# Patient Record
Sex: FEMALE | Race: WHITE | NOT HISPANIC OR LATINO | URBAN - METROPOLITAN AREA
[De-identification: names, ages, dates, MRNs, and addresses within clinical notes are randomized per-mention and may not be internally consistent; named-entity substitution may affect disease eponyms.]

---

## 2019-04-03 ENCOUNTER — HOSPITAL ENCOUNTER (EMERGENCY)
Facility: HOSPITAL | Age: 49
Discharge: HOME | End: 2019-04-04
Attending: EMERGENCY MEDICINE | Admitting: EMERGENCY MEDICINE

## 2019-04-03 DIAGNOSIS — R10.13 EPIGASTRIC PAIN: ICD-10-CM

## 2019-04-03 DIAGNOSIS — M25.50 POLYARTHRALGIA: ICD-10-CM

## 2019-04-03 DIAGNOSIS — M35.3 POLYMYALGIA (HCC): ICD-10-CM

## 2019-04-03 DIAGNOSIS — H81.10 BENIGN PAROXYSMAL POSITIONAL VERTIGO, UNSPECIFIED LATERALITY: Primary | ICD-10-CM

## 2019-04-03 DIAGNOSIS — E86.0 DEHYDRATION: ICD-10-CM

## 2019-04-03 PROCEDURE — 96374 THER/PROPH/DIAG INJ IV PUSH: CPT

## 2019-04-03 PROCEDURE — 99284 EMERGENCY DEPT VISIT MOD MDM: CPT

## 2019-04-03 PROCEDURE — 96375 TX/PRO/DX INJ NEW DRUG ADDON: CPT

## 2019-04-03 PROCEDURE — 96376 TX/PRO/DX INJ SAME DRUG ADON: CPT

## 2019-04-04 ENCOUNTER — APPOINTMENT (OUTPATIENT)
Dept: CT IMAGING | Facility: HOSPITAL | Age: 49
End: 2019-04-04

## 2019-04-04 ENCOUNTER — APPOINTMENT (OUTPATIENT)
Dept: GENERAL RADIOLOGY | Facility: HOSPITAL | Age: 49
End: 2019-04-04

## 2019-04-04 VITALS
SYSTOLIC BLOOD PRESSURE: 137 MMHG | DIASTOLIC BLOOD PRESSURE: 88 MMHG | WEIGHT: 126 LBS | HEART RATE: 111 BPM | RESPIRATION RATE: 18 BRPM | HEIGHT: 62 IN | TEMPERATURE: 98 F | OXYGEN SATURATION: 95 % | BODY MASS INDEX: 23.19 KG/M2

## 2019-04-04 LAB
ALBUMIN SERPL-MCNC: 4.4 G/DL (ref 3.5–5.2)
ALBUMIN/GLOB SERPL: 1.6 G/DL
ALP SERPL-CCNC: 130 U/L (ref 39–117)
ALT SERPL W P-5'-P-CCNC: 59 U/L (ref 1–33)
AMPHET+METHAMPHET UR QL: NEGATIVE
ANION GAP SERPL CALCULATED.3IONS-SCNC: 17.5 MMOL/L
AST SERPL-CCNC: 32 U/L (ref 1–32)
BARBITURATES UR QL SCN: NEGATIVE
BASOPHILS # BLD AUTO: 0.05 10*3/MM3 (ref 0–0.2)
BASOPHILS NFR BLD AUTO: 0.3 % (ref 0–1.5)
BENZODIAZ UR QL SCN: NEGATIVE
BILIRUB SERPL-MCNC: 0.2 MG/DL (ref 0.2–1.2)
BUN BLD-MCNC: 8 MG/DL (ref 6–20)
BUN/CREAT SERPL: 10.3 (ref 7–25)
CALCIUM SPEC-SCNC: 9.1 MG/DL (ref 8.6–10.5)
CANNABINOIDS SERPL QL: NEGATIVE
CHLORIDE SERPL-SCNC: 100 MMOL/L (ref 98–107)
CK SERPL-CCNC: 101 U/L (ref 20–180)
CO2 SERPL-SCNC: 20.5 MMOL/L (ref 22–29)
COCAINE UR QL: NEGATIVE
CREAT BLD-MCNC: 0.78 MG/DL (ref 0.57–1)
DEPRECATED RDW RBC AUTO: 39.8 FL (ref 37–54)
EOSINOPHIL # BLD AUTO: 0.03 10*3/MM3 (ref 0–0.4)
EOSINOPHIL NFR BLD AUTO: 0.2 % (ref 0.3–6.2)
ERYTHROCYTE [DISTWIDTH] IN BLOOD BY AUTOMATED COUNT: 11.9 % (ref 12.3–15.4)
ETHANOL BLD-MCNC: 66 MG/DL (ref 0–10)
ETHANOL UR QL: 0.07 %
GFR SERPL CREATININE-BSD FRML MDRD: 78 ML/MIN/1.73
GFR SERPL CREATININE-BSD FRML MDRD: 95 ML/MIN/1.73
GLOBULIN UR ELPH-MCNC: 2.8 GM/DL
GLUCOSE BLD-MCNC: 103 MG/DL (ref 65–99)
HCT VFR BLD AUTO: 45.6 % (ref 34–46.6)
HGB BLD-MCNC: 14.7 G/DL (ref 12–15.9)
IMM GRANULOCYTES # BLD AUTO: 0.12 10*3/MM3 (ref 0–0.05)
IMM GRANULOCYTES NFR BLD AUTO: 0.8 % (ref 0–0.5)
LIPASE SERPL-CCNC: 18 U/L (ref 13–60)
LYMPHOCYTES # BLD AUTO: 3.17 10*3/MM3 (ref 0.7–3.1)
LYMPHOCYTES NFR BLD AUTO: 21.8 % (ref 19.6–45.3)
MAGNESIUM SERPL-MCNC: 1.8 MG/DL (ref 1.6–2.6)
MCH RBC QN AUTO: 29.6 PG (ref 26.6–33)
MCHC RBC AUTO-ENTMCNC: 32.2 G/DL (ref 31.5–35.7)
MCV RBC AUTO: 91.8 FL (ref 79–97)
METHADONE UR QL SCN: NEGATIVE
MONOCYTES # BLD AUTO: 0.75 10*3/MM3 (ref 0.1–0.9)
MONOCYTES NFR BLD AUTO: 5.2 % (ref 5–12)
NEUTROPHILS # BLD AUTO: 10.39 10*3/MM3 (ref 1.4–7)
NEUTROPHILS NFR BLD AUTO: 71.7 % (ref 42.7–76)
NRBC BLD AUTO-RTO: 0 /100 WBC (ref 0–0)
OPIATES UR QL: NEGATIVE
OXYCODONE UR QL SCN: NEGATIVE
PLATELET # BLD AUTO: 238 10*3/MM3 (ref 140–450)
PMV BLD AUTO: 12.1 FL (ref 6–12)
POTASSIUM BLD-SCNC: 3.7 MMOL/L (ref 3.5–5.2)
PROT SERPL-MCNC: 7.2 G/DL (ref 6–8.5)
RBC # BLD AUTO: 4.97 10*6/MM3 (ref 3.77–5.28)
SODIUM BLD-SCNC: 138 MMOL/L (ref 136–145)
TROPONIN T SERPL-MCNC: <0.01 NG/ML (ref 0–0.03)
WBC NRBC COR # BLD: 14.51 10*3/MM3 (ref 3.4–10.8)

## 2019-04-04 PROCEDURE — 80307 DRUG TEST PRSMV CHEM ANLYZR: CPT | Performed by: PHYSICIAN ASSISTANT

## 2019-04-04 PROCEDURE — 96374 THER/PROPH/DIAG INJ IV PUSH: CPT

## 2019-04-04 PROCEDURE — 25010000002 ONDANSETRON PER 1 MG: Performed by: PHYSICIAN ASSISTANT

## 2019-04-04 PROCEDURE — 96375 TX/PRO/DX INJ NEW DRUG ADDON: CPT

## 2019-04-04 PROCEDURE — 82550 ASSAY OF CK (CPK): CPT | Performed by: PHYSICIAN ASSISTANT

## 2019-04-04 PROCEDURE — 70450 CT HEAD/BRAIN W/O DYE: CPT

## 2019-04-04 PROCEDURE — 25010000002 LORAZEPAM PER 2 MG: Performed by: EMERGENCY MEDICINE

## 2019-04-04 PROCEDURE — 93005 ELECTROCARDIOGRAM TRACING: CPT | Performed by: PHYSICIAN ASSISTANT

## 2019-04-04 PROCEDURE — 93010 ELECTROCARDIOGRAM REPORT: CPT | Performed by: INTERNAL MEDICINE

## 2019-04-04 PROCEDURE — 83690 ASSAY OF LIPASE: CPT | Performed by: PHYSICIAN ASSISTANT

## 2019-04-04 PROCEDURE — 96376 TX/PRO/DX INJ SAME DRUG ADON: CPT

## 2019-04-04 PROCEDURE — 71046 X-RAY EXAM CHEST 2 VIEWS: CPT

## 2019-04-04 PROCEDURE — 84484 ASSAY OF TROPONIN QUANT: CPT | Performed by: PHYSICIAN ASSISTANT

## 2019-04-04 PROCEDURE — 80053 COMPREHEN METABOLIC PANEL: CPT | Performed by: PHYSICIAN ASSISTANT

## 2019-04-04 PROCEDURE — 83735 ASSAY OF MAGNESIUM: CPT | Performed by: PHYSICIAN ASSISTANT

## 2019-04-04 PROCEDURE — 85025 COMPLETE CBC W/AUTO DIFF WBC: CPT | Performed by: PHYSICIAN ASSISTANT

## 2019-04-04 PROCEDURE — 25010000002 IOPAMIDOL 61 % SOLUTION: Performed by: EMERGENCY MEDICINE

## 2019-04-04 PROCEDURE — 74177 CT ABD & PELVIS W/CONTRAST: CPT

## 2019-04-04 RX ORDER — ONDANSETRON 2 MG/ML
4 INJECTION INTRAMUSCULAR; INTRAVENOUS ONCE
Status: COMPLETED | OUTPATIENT
Start: 2019-04-04 | End: 2019-04-04

## 2019-04-04 RX ORDER — PANTOPRAZOLE SODIUM 40 MG/1
40 TABLET, DELAYED RELEASE ORAL DAILY
Qty: 21 TABLET | Refills: 0 | Status: SHIPPED | OUTPATIENT
Start: 2019-04-04

## 2019-04-04 RX ORDER — SODIUM CHLORIDE 0.9 % (FLUSH) 0.9 %
10 SYRINGE (ML) INJECTION AS NEEDED
Status: DISCONTINUED | OUTPATIENT
Start: 2019-04-04 | End: 2019-04-04 | Stop reason: HOSPADM

## 2019-04-04 RX ORDER — LORAZEPAM 2 MG/ML
1 INJECTION INTRAMUSCULAR ONCE
Status: COMPLETED | OUTPATIENT
Start: 2019-04-04 | End: 2019-04-04

## 2019-04-04 RX ORDER — MECLIZINE HYDROCHLORIDE 25 MG/1
25 TABLET ORAL 3 TIMES DAILY PRN
Qty: 20 TABLET | Refills: 0 | Status: SHIPPED | OUTPATIENT
Start: 2019-04-04

## 2019-04-04 RX ORDER — LORAZEPAM 2 MG/ML
0.5 INJECTION INTRAMUSCULAR ONCE
Status: COMPLETED | OUTPATIENT
Start: 2019-04-04 | End: 2019-04-04

## 2019-04-04 RX ORDER — MECLIZINE HYDROCHLORIDE 25 MG/1
25 TABLET ORAL ONCE
Status: COMPLETED | OUTPATIENT
Start: 2019-04-04 | End: 2019-04-04

## 2019-04-04 RX ADMIN — SODIUM CHLORIDE 1000 ML: 9 INJECTION, SOLUTION INTRAVENOUS at 02:25

## 2019-04-04 RX ADMIN — ONDANSETRON 4 MG: 2 INJECTION INTRAMUSCULAR; INTRAVENOUS at 02:26

## 2019-04-04 RX ADMIN — LORAZEPAM 0.5 MG: 2 INJECTION INTRAMUSCULAR; INTRAVENOUS at 04:05

## 2019-04-04 RX ADMIN — IOPAMIDOL 85 ML: 612 INJECTION, SOLUTION INTRAVENOUS at 02:42

## 2019-04-04 RX ADMIN — SODIUM CHLORIDE 1000 ML: 9 INJECTION, SOLUTION INTRAVENOUS at 04:12

## 2019-04-04 RX ADMIN — LORAZEPAM 1 MG: 2 INJECTION INTRAMUSCULAR; INTRAVENOUS at 02:26

## 2019-04-04 RX ADMIN — LORAZEPAM 1 MG: 2 INJECTION INTRAMUSCULAR; INTRAVENOUS at 01:30

## 2019-04-04 RX ADMIN — MECLIZINE HYDROCHLORIDE 25 MG: 25 TABLET ORAL at 04:08

## 2019-04-04 RX ADMIN — SODIUM CHLORIDE 1000 ML: 9 INJECTION, SOLUTION INTRAVENOUS at 04:46

## 2019-04-04 NOTE — ED PROVIDER NOTES
" EMERGENCY DEPARTMENT ENCOUNTER    CHIEF COMPLAINT  Chief Complaint: abd pain  History given by: patient   History limited by: nothing  Room Number: 31/31  PMD: Yuri Wilson MD      HPI:  Pt is a 49 y.o. female who presents to the ED complaining of abd pain that has been going for a while but worsened this evening. Pt reports \"I am so sore, I hurt so bad\". Pt reports today she was doing yard-work and drinking a few beers and she felt fine. Pt admits L facial pain, myalgias, sneezing, SOA, intermittent CP, nausea, vomiting, and mild cough. Pt denies any urinary symptoms. Pt is a smoker. There are no other complaints at this time.     Duration: days  Onset: gradual   Timing: constant   Location: abd   Radiation: none mentioned   Quality: \"I am so sore, I hurt so bad\"  Intensity/Severity: moderate  Progression: unchanged   Associated Symptoms: pt admits L facial pain, myalgias, sneezing, SOA, intermittent CP, vomiting, and mild cough  Aggravating Factors: none mentioned   Alleviating Factors: none mentioned  Previous Episodes: none   Treatment before arrival: none    PAST MEDICAL HISTORY  Active Ambulatory Problems     Diagnosis Date Noted   • No Active Ambulatory Problems     Resolved Ambulatory Problems     Diagnosis Date Noted   • No Resolved Ambulatory Problems     No Additional Past Medical History       PAST SURGICAL HISTORY  History reviewed. No pertinent surgical history.    FAMILY HISTORY  History reviewed. No pertinent family history.    SOCIAL HISTORY  Social History     Socioeconomic History   • Marital status:      Spouse name: Not on file   • Number of children: Not on file   • Years of education: Not on file   • Highest education level: Not on file   Tobacco Use   • Smoking status: Former Smoker   • Smokeless tobacco: Never Used   Substance and Sexual Activity   • Alcohol use: Yes   • Drug use: No   • Sexual activity: Defer       ALLERGIES  Flagyl [metronidazole]    REVIEW OF SYSTEMS  Review " of Systems   Constitutional: Negative for fever.   HENT: Positive for sneezing. Negative for sore throat.    Eyes: Negative.    Respiratory: Positive for cough (mild) and shortness of breath.    Cardiovascular: Positive for chest pain (intermittent).   Gastrointestinal: Positive for abdominal pain, nausea and vomiting. Negative for diarrhea.   Genitourinary: Negative for dysuria.   Musculoskeletal: Positive for myalgias. Negative for neck pain.   Skin: Negative for rash.   Neurological: Negative for weakness, numbness and headaches.        L facial pain   Hematological: Negative.    Psychiatric/Behavioral: Negative.    All other systems reviewed and are negative.      PHYSICAL EXAM  ED Triage Vitals [04/03/19 2351]   Temp Heart Rate Resp BP SpO2   97.1 °F (36.2 °C) 90 22 126/84 97 %      Temp src Heart Rate Source Patient Position BP Location FiO2 (%)   -- -- -- -- --       Physical Exam   Constitutional: She is oriented to person, place, and time. No distress.   Pt appears anxious w a mild tremor   HENT:   Head: Normocephalic and atraumatic.   Eyes: EOM are normal. Pupils are equal, round, and reactive to light.   Neck: Normal range of motion. Neck supple.   Cardiovascular: Normal rate, regular rhythm and normal heart sounds.   Pulmonary/Chest: Effort normal and breath sounds normal. No respiratory distress.   Abdominal: Soft. There is tenderness (mild) in the epigastric area. There is no rebound and no guarding.   Musculoskeletal: Normal range of motion. She exhibits no edema.   Neurological: She is alert and oriented to person, place, and time. She has normal sensation and normal strength.   Skin: Skin is warm and dry. No rash noted.   Psychiatric: Mood and affect normal. Her mood appears anxious.   Nursing note and vitals reviewed.      LAB RESULTS  Lab Results (last 24 hours)     Procedure Component Value Units Date/Time    CBC & Differential [[id number]] Collected:  04/04/19 0045    Specimen:  Blood Updated:   04/04/19 0058    Narrative:       The following orders were created for panel order CBC & Differential.  Procedure                               Abnormality         Status                     ---------                               -----------         ------                     CBC Auto Differential[[id number]]        Abnormal            Final result                 Please view results for these tests on the individual orders.    Comprehensive Metabolic Panel [[id number]]  (Abnormal) Collected:  04/04/19 0045    Specimen:  Blood Updated:  04/04/19 0118     Glucose 103 mg/dL      BUN 8 mg/dL      Creatinine 0.78 mg/dL      Sodium 138 mmol/L      Potassium 3.7 mmol/L      Chloride 100 mmol/L      CO2 20.5 mmol/L      Calcium 9.1 mg/dL      Total Protein 7.2 g/dL      Albumin 4.40 g/dL      ALT (SGPT) 59 U/L      AST (SGOT) 32 U/L      Alkaline Phosphatase 130 U/L      Total Bilirubin 0.2 mg/dL      eGFR Non African Amer 78 mL/min/1.73      eGFR  African Amer 95 mL/min/1.73      Globulin 2.8 gm/dL      A/G Ratio 1.6 g/dL      BUN/Creatinine Ratio 10.3     Anion Gap 17.5 mmol/L     Narrative:       GFR Normal >60  Chronic Kidney Disease <60  Kidney Failure <15    Lipase [[id number]]  (Normal) Collected:  04/04/19 0045    Specimen:  Blood Updated:  04/04/19 0118     Lipase 18 U/L     Ethanol [[id number]]  (Abnormal) Collected:  04/04/19 0045    Specimen:  Blood Updated:  04/04/19 0118     Ethanol 66 mg/dL      Ethanol % 0.066 %     CBC Auto Differential [[id number]]  (Abnormal) Collected:  04/04/19 0045    Specimen:  Blood Updated:  04/04/19 0058     WBC 14.51 10*3/mm3      RBC 4.97 10*6/mm3      Hemoglobin 14.7 g/dL      Hematocrit 45.6 %      MCV 91.8 fL      MCH 29.6 pg      MCHC 32.2 g/dL      RDW 11.9 %      RDW-SD 39.8 fl      MPV 12.1 fL      Platelets 238 10*3/mm3      Neutrophil % 71.7 %      Lymphocyte % 21.8 %      Monocyte % 5.2 %      Eosinophil % 0.2 %      Basophil % 0.3 %      Immature Grans % 0.8 %       Neutrophils, Absolute 10.39 10*3/mm3      Lymphocytes, Absolute 3.17 10*3/mm3      Monocytes, Absolute 0.75 10*3/mm3      Eosinophils, Absolute 0.03 10*3/mm3      Basophils, Absolute 0.05 10*3/mm3      Immature Grans, Absolute 0.12 10*3/mm3      nRBC 0.0 /100 WBC     Magnesium [[id number]]  (Normal) Collected:  04/04/19 0045    Specimen:  Blood Updated:  04/04/19 0118     Magnesium 1.8 mg/dL     CK [[id number]]  (Normal) Collected:  04/04/19 0045    Specimen:  Blood Updated:  04/04/19 0118     Creatine Kinase 101 U/L     Troponin [[id number]]  (Normal) Collected:  04/04/19 0045    Specimen:  Blood Updated:  04/04/19 0209     Troponin T <0.010 ng/mL     Narrative:       Troponin T Reference Range:  <= 0.03 ng/mL-   Negative for AMI  >0.03 ng/mL-     Abnormal for myocardial necrosis.  Clinicians would have to utilize clinical acumen, EKG, Troponin and serial changes to determine if it is an Acute Myocardial Infarction or myocardial injury due to an underlying chronic condition.     Urine Drug Screen - Urine, Clean Catch [[id number]]  (Normal) Collected:  04/04/19 0137    Specimen:  Urine, Clean Catch Updated:  04/04/19 0215     Amphet/Methamphet, Screen Negative     Barbiturates Screen, Urine Negative     Benzodiazepine Screen, Urine Negative     Cocaine Screen, Urine Negative     Opiate Screen Negative     THC, Screen, Urine Negative     Methadone Screen, Urine Negative     Oxycodone Screen, Urine Negative    Narrative:       Negative Thresholds For Drugs Screened:     Amphetamines               500 ng/ml   Barbiturates               200 ng/ml   Benzodiazepines            100 ng/ml   Cocaine                    300 ng/ml   Methadone                  300 ng/ml   Opiates                    300 ng/ml   Oxycodone                  100 ng/ml   THC                        50 ng/ml    The Normal Value for all drugs tested is negative. This report includes final unconfirmed screening results to be used for medical treatment  purposes only. Unconfirmed results must not be used for non-medical purposes such as employment or legal testing. Clinical consideration should be applied to any drug of abuse test, particulary when unconfirmed results are used.          I ordered the above labs and reviewed the results    RADIOLOGY  CT Abdomen Pelvis With Contrast   Final Result   No acute intra-abdominal or intrapelvic process seen.       Radiation dose reduction techniques were utilized, including automated   exposure control and exposure modulation based on body size.       This report was finalized on 4/4/2019 3:05 AM by Dr. Fabiola Unger M.D.          CT Head Without Contrast   Final Result   No acute intracranial process identified.       Radiation dose reduction techniques were utilized, including automated   exposure control and exposure modulation based on body size.       This report was finalized on 4/4/2019 3:01 AM by Dr. Fabiola Unger M.D.          XR Chest 2 View   Final Result   Mild bibasilar atelectasis. No acute infiltrates.       This report was finalized on 4/4/2019 2:17 AM by Dr. Fabiola Unger M.D.               I ordered the above noted radiological studies. Interpreted by radiologist. Reviewed by me in PACS.       PROCEDURES  Procedures    EKG          EKG time: 0212  Rhythm/Rate: NSR, 75  P waves and WA: normal  QRS, axis: normal   ST and T waves: normal     Interpreted Contemporaneously by me, independently viewed  No prior for comparison.     PROGRESS AND CONSULTS     0011  Ordered urine drug screen, ethanol, and lipase for further evaluation.     0123  Ordered CXR and CT abd/pelvis for further evaluation.     0135  Ordered IVF bolus for hydration.     0203  Ordered Zofran for nausea.     0209  Ordered CT head for further evaluation.     0320  Ordered IVF bolus for hydration.     0335  Discussed pt case w Dr. Garcia who agrees w the plan of care after his own bedside evaluation of the patient.      0340  Rechecked pt. Pt is resting comfortably. Notified pt of lab results and imaging. Advised pt to RTER if any new/worsening symptoms develop. Discussed the plan to discharge after receiving fluids. Pt understands and agrees with the plan, all questions answered.    0354  Ordered IVF bolus for hydration.       MEDICAL DECISION MAKING  Results were reviewed/discussed with the patient and they were also made aware of online access. Pt also made aware that some labs, such as cultures, will not be resulted during ER visit and follow up with PMD is necessary.     MDM  Number of Diagnoses or Management Options     Amount and/or Complexity of Data Reviewed  Clinical lab tests: ordered and reviewed (Glucose= 103  Ethanol= 66  WBC= 14.51)  Tests in the radiology section of CPT®: ordered and reviewed (CT head= no acute intracranial process identified.   CXR= mild bibasilar atelectasis. No acute infiltrates.)  Tests in the medicine section of CPT®: ordered and reviewed (See EKG procedure note.)  Decide to obtain previous medical records or to obtain history from someone other than the patient: yes  Review and summarize past medical records: yes (Pt's previous medical records show pt had an obestrics and gynecology office visit on 12/14/2018 and was dx with LGSIL of cervix of undetermined significanc, S/P tubal ligation, S/P hysterectomy, S/P ectopic pregnancy.)  Discuss the patient with other providers: yes (Dr. Garcia)           DIAGNOSIS  Final diagnoses:   Benign paroxysmal positional vertigo, unspecified laterality   Polyarthralgia   Polymyalgia (CMS/HCC)   Epigastric pain   Dehydration       DISPOSITION  DISCHARGE    Patient discharged in stable condition.    Reviewed implications of results, diagnosis, meds, responsibility to follow up, warning signs and symptoms of possible worsening, potential complications and reasons to return to ER.    Patient/Family voiced understanding of above instructions.    Discussed  plan for discharge, as there is no emergent indication for admission. Patient referred to primary care provider for BP management due to today's BP. Pt/family is agreeable and understands need for follow up and repeat testing.  Pt is aware that discharge does not mean that nothing is wrong but it indicates no emergency is present that requires admission and they must continue care with follow-up as given below or physician of their choice.     FOLLOW-UP  Yuri Wilson MD  [id number] [id number]  [phone]    Schedule an appointment as soon as possible for a visit   For further evaluation and treatment         Medication List      New Prescriptions    meclizine 25 MG tablet  Commonly known as:  ANTIVERT  Take 1 tablet by mouth 3 (Three) Times a Day As Needed for dizziness or   Nausea.     pantoprazole 40 MG EC tablet  Commonly known as:  PROTONIX  Take 1 tablet by mouth Daily.              Latest Documented Vital Signs:  As of 5:42 AM  BP- 137/88 HR- 111 Temp- 98 °F (36.7 °C) (Oral) O2 sat- 95%    --  Documentation assistance provided by anh Callaway for Wesley Neal PA-C. Information recorded by the scribe was done at my direction and has been verified and validated by me.        Janis Callaway  04/04/19 0419       Akira Neal III, PA  04/04/19 5131

## 2019-04-04 NOTE — ED TRIAGE NOTES
Abd. Pain x few days reports sneezed and now left side of face hurts. Pt has multiple complaints and admits to ETOH of approximately 5 drinks today.

## 2019-04-04 NOTE — ED PROVIDER NOTES
"Pt is a 49 y.o. female who presents to the ED complaining of abd pain. Pt admits to HA, hand \"cramping\", and \"hot flashes\".  Pt denies fever, blurry vision, and double vision. Pt reports she had a \"sneezing attack\" and after it had stopped she felt like her vision was altered. Pt notes that she had 4-5 beers today. Pt notes that recently she has been drinking less. Pt notes hx of arthritis and tinnitus.      On exam,  Constitutional: NAD  HENT: No active nystagmus  Cardiovascular: RRR  Pulmonary: CTAB  Abdomen: mild epigastric tenderness, no rebound, guarding, or mass  Musculoskeletal: extremities unremarkable    Labs reviewed.     Plan: CT head for further evaluation.       MD ATTESTATION NOTE    The DAGOBERTO and I have discussed this patient's history, physical exam, and treatment plan.  I have reviewed the documentation and personally had a face to face interaction with the patient. I affirm the documentation and agree with the treatment and plan.  The attached note describes my personal findings.      Documentation assistance provided by anh Howe for Dr. Lerma. Information recorded by the anh was done at my direction and has been verified and validated by me.         Renya Howe  04/04/19 8946       Altaf Garcia MD  04/04/19 4541    "

## 2020-06-30 ENCOUNTER — APPOINTMENT (OUTPATIENT)
Dept: CT IMAGING | Facility: HOSPITAL | Age: 50
End: 2020-06-30

## 2020-06-30 ENCOUNTER — HOSPITAL ENCOUNTER (EMERGENCY)
Facility: HOSPITAL | Age: 50
Discharge: HOME | End: 2020-06-30
Attending: EMERGENCY MEDICINE | Admitting: EMERGENCY MEDICINE

## 2020-06-30 VITALS
OXYGEN SATURATION: 98 % | HEART RATE: 71 BPM | WEIGHT: 125 LBS | TEMPERATURE: 97.2 F | BODY MASS INDEX: 23 KG/M2 | RESPIRATION RATE: 16 BRPM | DIASTOLIC BLOOD PRESSURE: 79 MMHG | HEIGHT: 62 IN | SYSTOLIC BLOOD PRESSURE: 127 MMHG

## 2020-06-30 DIAGNOSIS — M54.32 SCIATICA OF LEFT SIDE: Primary | ICD-10-CM

## 2020-06-30 LAB
ALBUMIN SERPL-MCNC: 4.2 G/DL (ref 3.5–5.2)
ALBUMIN/GLOB SERPL: 1.6 G/DL
ALP SERPL-CCNC: 104 U/L (ref 39–117)
ALT SERPL W P-5'-P-CCNC: 49 U/L (ref 1–33)
ANION GAP SERPL CALCULATED.3IONS-SCNC: 10.2 MMOL/L (ref 5–15)
AST SERPL-CCNC: 41 U/L (ref 1–32)
BASOPHILS # BLD AUTO: 0.06 10*3/MM3 (ref 0–0.2)
BASOPHILS NFR BLD AUTO: 0.6 % (ref 0–1.5)
BILIRUB SERPL-MCNC: 0.4 MG/DL (ref 0.2–1.2)
BILIRUB UR QL STRIP: NEGATIVE
BUN SERPL-MCNC: 8 MG/DL (ref 6–20)
BUN/CREAT SERPL: 11.9 (ref 7–25)
CALCIUM SPEC-SCNC: 9.1 MG/DL (ref 8.6–10.5)
CHLORIDE SERPL-SCNC: 109 MMOL/L (ref 98–107)
CLARITY UR: ABNORMAL
CO2 SERPL-SCNC: 23.8 MMOL/L (ref 22–29)
COLOR UR: YELLOW
CREAT SERPL-MCNC: 0.67 MG/DL (ref 0.57–1)
DEPRECATED RDW RBC AUTO: 40.9 FL (ref 37–54)
EOSINOPHIL # BLD AUTO: 0.12 10*3/MM3 (ref 0–0.4)
EOSINOPHIL NFR BLD AUTO: 1.3 % (ref 0.3–6.2)
ERYTHROCYTE [DISTWIDTH] IN BLOOD BY AUTOMATED COUNT: 12 % (ref 12.3–15.4)
GFR SERPL CREATININE-BSD FRML MDRD: 93 ML/MIN/1.73
GLOBULIN UR ELPH-MCNC: 2.6 GM/DL
GLUCOSE SERPL-MCNC: 102 MG/DL (ref 65–99)
GLUCOSE UR STRIP-MCNC: NEGATIVE MG/DL
HCT VFR BLD AUTO: 48.4 % (ref 34–46.6)
HGB BLD-MCNC: 16 G/DL (ref 12–15.9)
HGB UR QL STRIP.AUTO: NEGATIVE
HOLD SPECIMEN: NORMAL
HOLD SPECIMEN: NORMAL
IMM GRANULOCYTES # BLD AUTO: 0.03 10*3/MM3 (ref 0–0.05)
IMM GRANULOCYTES NFR BLD AUTO: 0.3 % (ref 0–0.5)
KETONES UR QL STRIP: NEGATIVE
LEUKOCYTE ESTERASE UR QL STRIP.AUTO: NEGATIVE
LYMPHOCYTES # BLD AUTO: 3.29 10*3/MM3 (ref 0.7–3.1)
LYMPHOCYTES NFR BLD AUTO: 35.3 % (ref 19.6–45.3)
MCH RBC QN AUTO: 30.4 PG (ref 26.6–33)
MCHC RBC AUTO-ENTMCNC: 33.1 G/DL (ref 31.5–35.7)
MCV RBC AUTO: 91.8 FL (ref 79–97)
MONOCYTES # BLD AUTO: 0.59 10*3/MM3 (ref 0.1–0.9)
MONOCYTES NFR BLD AUTO: 6.3 % (ref 5–12)
NEUTROPHILS NFR BLD AUTO: 5.23 10*3/MM3 (ref 1.7–7)
NEUTROPHILS NFR BLD AUTO: 56.2 % (ref 42.7–76)
NITRITE UR QL STRIP: NEGATIVE
NRBC BLD AUTO-RTO: 0 /100 WBC (ref 0–0.2)
PH UR STRIP.AUTO: 8 [PH] (ref 5–8)
PLATELET # BLD AUTO: 259 10*3/MM3 (ref 140–450)
PMV BLD AUTO: 13 FL (ref 6–12)
POTASSIUM SERPL-SCNC: 4 MMOL/L (ref 3.5–5.2)
PROT SERPL-MCNC: 6.8 G/DL (ref 6–8.5)
PROT UR QL STRIP: NEGATIVE
RBC # BLD AUTO: 5.27 10*6/MM3 (ref 3.77–5.28)
SODIUM SERPL-SCNC: 143 MMOL/L (ref 136–145)
SP GR UR STRIP: 1.01 (ref 1–1.03)
UROBILINOGEN UR QL STRIP: ABNORMAL
WBC # BLD AUTO: 9.32 10*3/MM3 (ref 3.4–10.8)
WHOLE BLOOD HOLD SPECIMEN: NORMAL
WHOLE BLOOD HOLD SPECIMEN: NORMAL

## 2020-06-30 PROCEDURE — 25010000002 MORPHINE PER 10 MG: Performed by: EMERGENCY MEDICINE

## 2020-06-30 PROCEDURE — 74177 CT ABD & PELVIS W/CONTRAST: CPT

## 2020-06-30 PROCEDURE — 25010000002 KETOROLAC TROMETHAMINE PER 15 MG: Performed by: EMERGENCY MEDICINE

## 2020-06-30 PROCEDURE — 81003 URINALYSIS AUTO W/O SCOPE: CPT | Performed by: EMERGENCY MEDICINE

## 2020-06-30 PROCEDURE — 99284 EMERGENCY DEPT VISIT MOD MDM: CPT

## 2020-06-30 PROCEDURE — 25010000002 HYDROMORPHONE PER 4 MG: Performed by: EMERGENCY MEDICINE

## 2020-06-30 PROCEDURE — 96374 THER/PROPH/DIAG INJ IV PUSH: CPT

## 2020-06-30 PROCEDURE — 85025 COMPLETE CBC W/AUTO DIFF WBC: CPT | Performed by: EMERGENCY MEDICINE

## 2020-06-30 PROCEDURE — 80053 COMPREHEN METABOLIC PANEL: CPT | Performed by: EMERGENCY MEDICINE

## 2020-06-30 PROCEDURE — 96375 TX/PRO/DX INJ NEW DRUG ADDON: CPT

## 2020-06-30 PROCEDURE — 25010000002 IOPAMIDOL 61 % SOLUTION: Performed by: EMERGENCY MEDICINE

## 2020-06-30 RX ORDER — METHYLPREDNISOLONE 4 MG/1
TABLET ORAL
Qty: 21 EACH | Refills: 0 | Status: SHIPPED | OUTPATIENT
Start: 2020-06-30

## 2020-06-30 RX ORDER — MORPHINE SULFATE 2 MG/ML
4 INJECTION, SOLUTION INTRAMUSCULAR; INTRAVENOUS ONCE
Status: COMPLETED | OUTPATIENT
Start: 2020-06-30 | End: 2020-06-30

## 2020-06-30 RX ORDER — HYDROCODONE BITARTRATE AND ACETAMINOPHEN 5; 325 MG/1; MG/1
1 TABLET ORAL ONCE
Status: COMPLETED | OUTPATIENT
Start: 2020-06-30 | End: 2020-06-30

## 2020-06-30 RX ORDER — HYDROMORPHONE HYDROCHLORIDE 1 MG/ML
0.5 INJECTION, SOLUTION INTRAMUSCULAR; INTRAVENOUS; SUBCUTANEOUS ONCE
Status: COMPLETED | OUTPATIENT
Start: 2020-06-30 | End: 2020-06-30

## 2020-06-30 RX ORDER — KETOROLAC TROMETHAMINE 15 MG/ML
15 INJECTION, SOLUTION INTRAMUSCULAR; INTRAVENOUS ONCE
Status: COMPLETED | OUTPATIENT
Start: 2020-06-30 | End: 2020-06-30

## 2020-06-30 RX ADMIN — IOPAMIDOL 85 ML: 612 INJECTION, SOLUTION INTRAVENOUS at 13:53

## 2020-06-30 RX ADMIN — HYDROCODONE BITARTRATE AND ACETAMINOPHEN 1 TABLET: 5; 325 TABLET ORAL at 14:55

## 2020-06-30 RX ADMIN — KETOROLAC TROMETHAMINE 15 MG: 15 INJECTION, SOLUTION INTRAMUSCULAR; INTRAVENOUS at 14:55

## 2020-06-30 RX ADMIN — HYDROMORPHONE HYDROCHLORIDE 0.5 MG: 1 INJECTION, SOLUTION INTRAMUSCULAR; INTRAVENOUS; SUBCUTANEOUS at 14:17

## 2020-06-30 RX ADMIN — MORPHINE SULFATE 4 MG: 2 INJECTION, SOLUTION INTRAMUSCULAR; INTRAVENOUS at 13:20

## 2022-11-02 ENCOUNTER — APPOINTMENT (OUTPATIENT)
Dept: CT IMAGING | Facility: HOSPITAL | Age: 52
End: 2022-11-02

## 2022-11-02 ENCOUNTER — HOSPITAL ENCOUNTER (EMERGENCY)
Facility: HOSPITAL | Age: 52
Discharge: HOME | End: 2022-11-02
Attending: EMERGENCY MEDICINE | Admitting: EMERGENCY MEDICINE

## 2022-11-02 ENCOUNTER — APPOINTMENT (OUTPATIENT)
Dept: GENERAL RADIOLOGY | Facility: HOSPITAL | Age: 52
End: 2022-11-02

## 2022-11-02 VITALS
BODY MASS INDEX: 20.98 KG/M2 | OXYGEN SATURATION: 97 % | TEMPERATURE: 98.4 F | DIASTOLIC BLOOD PRESSURE: 82 MMHG | WEIGHT: 114 LBS | HEART RATE: 64 BPM | HEIGHT: 62 IN | RESPIRATION RATE: 16 BRPM | SYSTOLIC BLOOD PRESSURE: 156 MMHG

## 2022-11-02 DIAGNOSIS — B34.8 RHINOVIRUS INFECTION: ICD-10-CM

## 2022-11-02 DIAGNOSIS — R09.1 PLEURISY: Primary | ICD-10-CM

## 2022-11-02 LAB
ALBUMIN SERPL-MCNC: 4 G/DL (ref 3.5–5.2)
ALBUMIN/GLOB SERPL: 1.5 G/DL
ALP SERPL-CCNC: 93 U/L (ref 39–117)
ALT SERPL W P-5'-P-CCNC: 18 U/L (ref 1–33)
ANION GAP SERPL CALCULATED.3IONS-SCNC: 8.2 MMOL/L (ref 5–15)
AST SERPL-CCNC: 17 U/L (ref 1–32)
B PARAPERT DNA SPEC QL NAA+PROBE: NOT DETECTED
B PERT DNA SPEC QL NAA+PROBE: NOT DETECTED
BASOPHILS # BLD AUTO: 0.05 10*3/MM3 (ref 0–0.2)
BASOPHILS NFR BLD AUTO: 0.4 % (ref 0–1.5)
BILIRUB SERPL-MCNC: <0.2 MG/DL (ref 0–1.2)
BUN SERPL-MCNC: 10 MG/DL (ref 6–20)
BUN/CREAT SERPL: 12.2 (ref 7–25)
C PNEUM DNA NPH QL NAA+NON-PROBE: NOT DETECTED
CALCIUM SPEC-SCNC: 9.5 MG/DL (ref 8.6–10.5)
CHLORIDE SERPL-SCNC: 106 MMOL/L (ref 98–107)
CO2 SERPL-SCNC: 26.8 MMOL/L (ref 22–29)
CREAT SERPL-MCNC: 0.82 MG/DL (ref 0.57–1)
D DIMER PPP FEU-MCNC: 0.57 MCGFEU/ML (ref 0–0.49)
DEPRECATED RDW RBC AUTO: 38.7 FL (ref 37–54)
EGFRCR SERPLBLD CKD-EPI 2021: 85.7 ML/MIN/1.73
EOSINOPHIL # BLD AUTO: 0.15 10*3/MM3 (ref 0–0.4)
EOSINOPHIL NFR BLD AUTO: 1.3 % (ref 0.3–6.2)
ERYTHROCYTE [DISTWIDTH] IN BLOOD BY AUTOMATED COUNT: 12.1 % (ref 12.3–15.4)
FLUAV SUBTYP SPEC NAA+PROBE: NOT DETECTED
FLUBV RNA ISLT QL NAA+PROBE: NOT DETECTED
GLOBULIN UR ELPH-MCNC: 2.6 GM/DL
GLUCOSE SERPL-MCNC: 92 MG/DL (ref 65–99)
HADV DNA SPEC NAA+PROBE: NOT DETECTED
HCOV 229E RNA SPEC QL NAA+PROBE: NOT DETECTED
HCOV HKU1 RNA SPEC QL NAA+PROBE: NOT DETECTED
HCOV NL63 RNA SPEC QL NAA+PROBE: NOT DETECTED
HCOV OC43 RNA SPEC QL NAA+PROBE: NOT DETECTED
HCT VFR BLD AUTO: 40.3 % (ref 34–46.6)
HGB BLD-MCNC: 13.9 G/DL (ref 12–15.9)
HMPV RNA NPH QL NAA+NON-PROBE: NOT DETECTED
HPIV1 RNA ISLT QL NAA+PROBE: NOT DETECTED
HPIV2 RNA SPEC QL NAA+PROBE: NOT DETECTED
HPIV3 RNA NPH QL NAA+PROBE: NOT DETECTED
HPIV4 P GENE NPH QL NAA+PROBE: NOT DETECTED
IMM GRANULOCYTES # BLD AUTO: 0.04 10*3/MM3 (ref 0–0.05)
IMM GRANULOCYTES NFR BLD AUTO: 0.4 % (ref 0–0.5)
LYMPHOCYTES # BLD AUTO: 4.64 10*3/MM3 (ref 0.7–3.1)
LYMPHOCYTES NFR BLD AUTO: 41.4 % (ref 19.6–45.3)
M PNEUMO IGG SER IA-ACNC: NOT DETECTED
MCH RBC QN AUTO: 30.1 PG (ref 26.6–33)
MCHC RBC AUTO-ENTMCNC: 34.5 G/DL (ref 31.5–35.7)
MCV RBC AUTO: 87.2 FL (ref 79–97)
MONOCYTES # BLD AUTO: 0.66 10*3/MM3 (ref 0.1–0.9)
MONOCYTES NFR BLD AUTO: 5.9 % (ref 5–12)
NEUTROPHILS NFR BLD AUTO: 5.67 10*3/MM3 (ref 1.7–7)
NEUTROPHILS NFR BLD AUTO: 50.6 % (ref 42.7–76)
NRBC BLD AUTO-RTO: 0 /100 WBC (ref 0–0.2)
NT-PROBNP SERPL-MCNC: 132 PG/ML (ref 0–900)
PLATELET # BLD AUTO: 246 10*3/MM3 (ref 140–450)
PMV BLD AUTO: 12.1 FL (ref 6–12)
POTASSIUM SERPL-SCNC: 3.9 MMOL/L (ref 3.5–5.2)
PROCALCITONIN SERPL-MCNC: 0.03 NG/ML (ref 0–0.25)
PROT SERPL-MCNC: 6.6 G/DL (ref 6–8.5)
RBC # BLD AUTO: 4.62 10*6/MM3 (ref 3.77–5.28)
RHINOVIRUS RNA SPEC NAA+PROBE: DETECTED
RSV RNA NPH QL NAA+NON-PROBE: NOT DETECTED
SARS-COV-2 RNA NPH QL NAA+NON-PROBE: NOT DETECTED
SODIUM SERPL-SCNC: 141 MMOL/L (ref 136–145)
TROPONIN T SERPL-MCNC: <0.01 NG/ML (ref 0–0.03)
WBC NRBC COR # BLD: 11.21 10*3/MM3 (ref 3.4–10.8)

## 2022-11-02 PROCEDURE — 71045 X-RAY EXAM CHEST 1 VIEW: CPT

## 2022-11-02 PROCEDURE — 80053 COMPREHEN METABOLIC PANEL: CPT | Performed by: EMERGENCY MEDICINE

## 2022-11-02 PROCEDURE — 96375 TX/PRO/DX INJ NEW DRUG ADDON: CPT

## 2022-11-02 PROCEDURE — 93005 ELECTROCARDIOGRAM TRACING: CPT | Performed by: EMERGENCY MEDICINE

## 2022-11-02 PROCEDURE — 0202U NFCT DS 22 TRGT SARS-COV-2: CPT | Performed by: EMERGENCY MEDICINE

## 2022-11-02 PROCEDURE — 83880 ASSAY OF NATRIURETIC PEPTIDE: CPT | Performed by: EMERGENCY MEDICINE

## 2022-11-02 PROCEDURE — 96374 THER/PROPH/DIAG INJ IV PUSH: CPT

## 2022-11-02 PROCEDURE — 25010000002 DEXAMETHASONE PER 1 MG: Performed by: EMERGENCY MEDICINE

## 2022-11-02 PROCEDURE — 93010 ELECTROCARDIOGRAM REPORT: CPT | Performed by: INTERNAL MEDICINE

## 2022-11-02 PROCEDURE — 0 IOPAMIDOL PER 1 ML: Performed by: EMERGENCY MEDICINE

## 2022-11-02 PROCEDURE — 85379 FIBRIN DEGRADATION QUANT: CPT | Performed by: EMERGENCY MEDICINE

## 2022-11-02 PROCEDURE — 84484 ASSAY OF TROPONIN QUANT: CPT | Performed by: EMERGENCY MEDICINE

## 2022-11-02 PROCEDURE — 85025 COMPLETE CBC W/AUTO DIFF WBC: CPT | Performed by: EMERGENCY MEDICINE

## 2022-11-02 PROCEDURE — 99284 EMERGENCY DEPT VISIT MOD MDM: CPT

## 2022-11-02 PROCEDURE — 25010000002 KETOROLAC TROMETHAMINE PER 15 MG: Performed by: EMERGENCY MEDICINE

## 2022-11-02 PROCEDURE — 71275 CT ANGIOGRAPHY CHEST: CPT

## 2022-11-02 PROCEDURE — 84145 PROCALCITONIN (PCT): CPT | Performed by: EMERGENCY MEDICINE

## 2022-11-02 RX ORDER — HYDROCODONE BITARTRATE AND ACETAMINOPHEN 7.5; 325 MG/1; MG/1
1 TABLET ORAL ONCE
Status: COMPLETED | OUTPATIENT
Start: 2022-11-02 | End: 2022-11-02

## 2022-11-02 RX ORDER — SODIUM CHLORIDE 0.9 % (FLUSH) 0.9 %
10 SYRINGE (ML) INJECTION AS NEEDED
Status: DISCONTINUED | OUTPATIENT
Start: 2022-11-02 | End: 2022-11-03 | Stop reason: HOSPADM

## 2022-11-02 RX ORDER — KETOROLAC TROMETHAMINE 15 MG/ML
15 INJECTION, SOLUTION INTRAMUSCULAR; INTRAVENOUS ONCE
Status: COMPLETED | OUTPATIENT
Start: 2022-11-02 | End: 2022-11-02

## 2022-11-02 RX ORDER — DEXAMETHASONE SODIUM PHOSPHATE 10 MG/ML
6 INJECTION INTRAMUSCULAR; INTRAVENOUS ONCE
Status: COMPLETED | OUTPATIENT
Start: 2022-11-02 | End: 2022-11-02

## 2022-11-02 RX ORDER — METHYLPREDNISOLONE 4 MG/1
TABLET ORAL
Qty: 21 EACH | Refills: 0 | Status: SHIPPED | OUTPATIENT
Start: 2022-11-02

## 2022-11-02 RX ORDER — ALBUTEROL SULFATE 90 UG/1
2 AEROSOL, METERED RESPIRATORY (INHALATION) EVERY 4 HOURS PRN
Qty: 8 G | Refills: 0 | Status: SHIPPED | OUTPATIENT
Start: 2022-11-02

## 2022-11-02 RX ORDER — CYCLOBENZAPRINE HCL 10 MG
10 TABLET ORAL ONCE
Status: COMPLETED | OUTPATIENT
Start: 2022-11-02 | End: 2022-11-02

## 2022-11-02 RX ADMIN — KETOROLAC TROMETHAMINE 15 MG: 15 INJECTION, SOLUTION INTRAMUSCULAR; INTRAVENOUS at 21:16

## 2022-11-02 RX ADMIN — DEXAMETHASONE SODIUM PHOSPHATE 6 MG: 10 INJECTION INTRAMUSCULAR; INTRAVENOUS at 22:31

## 2022-11-02 RX ADMIN — CYCLOBENZAPRINE 10 MG: 10 TABLET, FILM COATED ORAL at 22:44

## 2022-11-02 RX ADMIN — HYDROCODONE BITARTRATE AND ACETAMINOPHEN 1 TABLET: 7.5; 325 TABLET ORAL at 22:30

## 2022-11-02 RX ADMIN — IOPAMIDOL 85 ML: 755 INJECTION, SOLUTION INTRAVENOUS at 22:08

## 2022-11-02 NOTE — ED PROVIDER NOTES
EMERGENCY DEPARTMENT ENCOUNTER    Room Number:  21/21  Date of encounter:  11/3/2022  PCP: Yuri Wilson MD  Historian: Patient      HPI:  Chief Complaint: Multiple complaint  A complete HPI/ROS/PMH/PSH/SH/FH are unobtainable due to: None    Context: Kim Cancino is a 52 y.o. female who presents to the ED c/o multiple complaints over the last month or 2.  Patient states she was diagnosed with COVID 2 months ago and had a very difficult time with that lasting almost a month.  After she got over that, she continued to have cough with greenish sputum, shortness of breath and wheezing, weakness, fatigue, lightheadedness which are all moderate in severity and she struggles with every day.    She has not had a fever, she does not feel particular short of breath, and she has this sharp pain in the left lung zone which she refers to the interscapular area on the left as well as the left upper chest.  It does hurt when she coughs and takes a deep breath.  Patient is a smoker, no history of VTE, does have COPD and has been taking treatments at home she has not been on any steroids recently.      PAST MEDICAL HISTORY  Active Ambulatory Problems     Diagnosis Date Noted   • No Active Ambulatory Problems     Resolved Ambulatory Problems     Diagnosis Date Noted   • No Resolved Ambulatory Problems     No Additional Past Medical History         PAST SURGICAL HISTORY  No past surgical history on file.      FAMILY HISTORY  No family history on file.      SOCIAL HISTORY  Social History     Socioeconomic History   • Marital status:    Tobacco Use   • Smoking status: Former   • Smokeless tobacco: Never   Substance and Sexual Activity   • Alcohol use: Yes   • Drug use: No   • Sexual activity: Defer         ALLERGIES  Flagyl [metronidazole]        REVIEW OF SYSTEMS  Review of Systems     All systems reviewed and negative except for those discussed in HPI.       PHYSICAL EXAM    I have reviewed the triage vital signs and  nursing notes.    ED Triage Vitals [11/02/22 1931]   Temp Heart Rate Resp BP SpO2   98.4 °F (36.9 °C) 77 16 (!) 180/105 99 %      Temp src Heart Rate Source Patient Position BP Location FiO2 (%)   Tympanic -- Standing Left arm --       Physical Exam  GENERAL: not distressed, nontoxic  HENT: nares patent  EYES: no scleral icterus  CV: regular rhythm, regular rate  RESPIRATORY: normal effort, CTA bilaterally with no respiratory distress  ABDOMEN: soft  MUSCULOSKELETAL: no deformity, no calf tenderness or pedal edema  NEURO: alert, moves all extremities, follows commands  SKIN: warm, dry        LAB RESULTS  Recent Results (from the past 24 hour(s))   ECG 12 Lead Chest Pain    Collection Time: 11/02/22  7:38 PM   Result Value Ref Range    QT Interval 413 ms   Comprehensive Metabolic Panel    Collection Time: 11/02/22  7:56 PM    Specimen: Blood   Result Value Ref Range    Glucose 92 65 - 99 mg/dL    BUN 10 6 - 20 mg/dL    Creatinine 0.82 0.57 - 1.00 mg/dL    Sodium 141 136 - 145 mmol/L    Potassium 3.9 3.5 - 5.2 mmol/L    Chloride 106 98 - 107 mmol/L    CO2 26.8 22.0 - 29.0 mmol/L    Calcium 9.5 8.6 - 10.5 mg/dL    Total Protein 6.6 6.0 - 8.5 g/dL    Albumin 4.00 3.50 - 5.20 g/dL    ALT (SGPT) 18 1 - 33 U/L    AST (SGOT) 17 1 - 32 U/L    Alkaline Phosphatase 93 39 - 117 U/L    Total Bilirubin <0.2 0.0 - 1.2 mg/dL    Globulin 2.6 gm/dL    A/G Ratio 1.5 g/dL    BUN/Creatinine Ratio 12.2 7.0 - 25.0    Anion Gap 8.2 5.0 - 15.0 mmol/L    eGFR 85.7 >60.0 mL/min/1.73   BNP    Collection Time: 11/02/22  7:56 PM    Specimen: Blood   Result Value Ref Range    proBNP 132.0 0.0 - 900.0 pg/mL   Troponin    Collection Time: 11/02/22  7:56 PM    Specimen: Blood   Result Value Ref Range    Troponin T <0.010 0.000 - 0.030 ng/mL   D-dimer, Quantitative    Collection Time: 11/02/22  7:56 PM    Specimen: Blood   Result Value Ref Range    D-Dimer, Quantitative 0.57 (H) 0.00 - 0.49 MCGFEU/mL   Procalcitonin    Collection Time: 11/02/22  7:56  PM    Specimen: Blood   Result Value Ref Range    Procalcitonin 0.03 0.00 - 0.25 ng/mL   CBC Auto Differential    Collection Time: 11/02/22  7:56 PM    Specimen: Blood   Result Value Ref Range    WBC 11.21 (H) 3.40 - 10.80 10*3/mm3    RBC 4.62 3.77 - 5.28 10*6/mm3    Hemoglobin 13.9 12.0 - 15.9 g/dL    Hematocrit 40.3 34.0 - 46.6 %    MCV 87.2 79.0 - 97.0 fL    MCH 30.1 26.6 - 33.0 pg    MCHC 34.5 31.5 - 35.7 g/dL    RDW 12.1 (L) 12.3 - 15.4 %    RDW-SD 38.7 37.0 - 54.0 fl    MPV 12.1 (H) 6.0 - 12.0 fL    Platelets 246 140 - 450 10*3/mm3    Neutrophil % 50.6 42.7 - 76.0 %    Lymphocyte % 41.4 19.6 - 45.3 %    Monocyte % 5.9 5.0 - 12.0 %    Eosinophil % 1.3 0.3 - 6.2 %    Basophil % 0.4 0.0 - 1.5 %    Immature Grans % 0.4 0.0 - 0.5 %    Neutrophils, Absolute 5.67 1.70 - 7.00 10*3/mm3    Lymphocytes, Absolute 4.64 (H) 0.70 - 3.10 10*3/mm3    Monocytes, Absolute 0.66 0.10 - 0.90 10*3/mm3    Eosinophils, Absolute 0.15 0.00 - 0.40 10*3/mm3    Basophils, Absolute 0.05 0.00 - 0.20 10*3/mm3    Immature Grans, Absolute 0.04 0.00 - 0.05 10*3/mm3    nRBC 0.0 0.0 - 0.2 /100 WBC   Respiratory Panel PCR w/COVID-19(SARS-CoV-2) TEDDY/JANIE/ANDREA/PAD/COR/MAD/CHRIS In-House, NP Swab in [facility], 3-4 HR TAT - Swab, Nasopharynx    Collection Time: 11/02/22  7:57 PM    Specimen: Nasopharynx; Swab   Result Value Ref Range    ADENOVIRUS, PCR Not Detected Not Detected    Coronavirus 229E Not Detected Not Detected    Coronavirus HKU1 Not Detected Not Detected    Coronavirus NL63 Not Detected Not Detected    Coronavirus OC43 Not Detected Not Detected    COVID19 Not Detected Not Detected - Ref. Range    Human Metapneumovirus Not Detected Not Detected    Human Rhinovirus/Enterovirus Detected (A) Not Detected    Influenza A PCR Not Detected Not Detected    Influenza B PCR Not Detected Not Detected    Parainfluenza Virus 1 Not Detected Not Detected    Parainfluenza Virus 2 Not Detected Not Detected    Parainfluenza Virus 3 Not Detected Not Detected     Parainfluenza Virus 4 Not Detected Not Detected    RSV, PCR Not Detected Not Detected    Bordetella pertussis pcr Not Detected Not Detected    Bordetella parapertussis PCR Not Detected Not Detected    Chlamydophila pneumoniae PCR Not Detected Not Detected    Mycoplasma pneumo by PCR Not Detected Not Detected       Ordered the above labs and independently reviewed the results.        RADIOLOGY  XR Chest 1 View    Result Date: 11/2/2022  XR CHEST 1 VW-  HISTORY: Female who is 52 years-old,  chest pain  TECHNIQUE: Frontal view of the chest  COMPARISON: 4/4/2019  FINDINGS: Heart, mediastinum and pulmonary vasculature are unremarkable. No focal pulmonary consolidation, pleural effusion, or pneumothorax. No acute osseous process.      No evidence for acute pulmonary process. Follow-up as clinical indications persist.  This report was finalized on 11/2/2022 8:55 PM by Dr. Rob Cohn M.D.      CT Angiogram Chest    Result Date: 11/2/2022  CT ANGIOGRAM OF THE CHEST  HISTORY: COVID  COMPARISON: None available.  TECHNIQUE: Axial CT imaging was obtained through the thorax. IV contrast was administered. Three-D reformatted images were obtained.  FINDINGS: No acute pulmonary thromboembolus is seen. Thoracic aorta is normal in caliber. There is no evidence of dissection. There are coronary artery calcifications. Thyroid gland, trachea, and esophagus appear within normal limits. Mediastinal lymph nodes do not appear pathologically enlarged. There is no pleural or pericardial effusion. There are background emphysematous changes. There is biapical scarring. 8 mm groundglass infiltrate is noted within the lingula. There is some scarring also noted within the lingula. Images through the upper abdomen do not demonstrate any acute abnormalities. There does appear to be a retroaortic left renal vein. No acute osseous abnormalities are seen.       1. No acute pulmonary embolus seen. 2. Groundglass infiltrate identified within the  lingula. It is likely infectious or inflammatory, but CT follow-up in 6 months is suggested.  Radiation dose reduction techniques were utilized, including automated exposure control and exposure modulation based on body size.  This report was finalized on 11/2/2022 10:55 PM by Dr. Fabiola Unger M.D.        I ordered the above noted radiological studies. Reviewed by me and discussed with radiologist.  See dictation for official radiology interpretation.      PROCEDURES    Procedures      MEDICATIONS GIVEN IN ER    Medications   ketorolac (TORADOL) injection 15 mg (15 mg Intravenous Given 11/2/22 2116)   HYDROcodone-acetaminophen (NORCO) 7.5-325 MG per tablet 1 tablet (1 tablet Oral Given 11/2/22 2230)   iopamidol (ISOVUE-370) 76 % injection 100 mL (85 mL Intravenous Given 11/2/22 2208)   dexamethasone (DECADRON) injection 6 mg (6 mg Intravenous Given 11/2/22 2231)   cyclobenzaprine (FLEXERIL) tablet 10 mg (10 mg Oral Given 11/2/22 2244)         PROGRESS, DATA ANALYSIS, CONSULTS, AND MEDICAL DECISION MAKING    All labs have been independently reviewed by me.  All radiology studies have been reviewed by me and discussed with radiologist dictating the report.   EKG's independently viewed and interpreted by me.  Discussion below represents my analysis of pertinent findings related to patient's condition, differential diagnosis, treatment plan and final disposition.        ED Course as of 11/03/22 0316   Thu Nov 03, 2022   0313 CBC shows a mild leukocytosis, chemistry unremarkable [DP]   0314 Troponin, proBNP and procalcitonin negative [DP]   0314 D-dimer slightly elevated [DP]   0314 Respiratory viral panel positive for human rhinovirus [DP]   0314 Chest x-ray [DP]   0314 CT angiogram shows no evidence of pulmonary embolus, no congestive heart failure, and there is some faint areas of haziness which could represent viral pneumonia [DP]   0314 EKG at 1938  Normal sinus rhythm in the 70s please note that lead V4 was  off  Normal SC and QT  No acute ST segment changes seen to suggest ischemia, no significant change compared to April 4, 2019 [DP]   0315 I gave the patient some Toradol and dexamethasone.  This does not appear to be cardiac in my opinion.  This appears to be a viral URI in a patient with nicotine dependence and COPD.  The pain she is describing is pleuritic but is not from a pulmonary embolus or a bacterial infection that requires treatment. [DP]   0315 Discussed with her smoking cessation, prescribed a Medrol Dosepak, and advised that there is no need for antibiotics in this case since this is a viral [DP]      ED Course User Index  [DP] Ricardo Fowler MD           PPE: The patient wore a surgical mask throughout the entire patient encounter. I wore an N95.    AS OF 03:16 EDT VITALS:    BP - 156/82  HR - 64  TEMP - 98.4 °F (36.9 °C) (Tympanic)  O2 SATS - 97%        DIAGNOSIS  Final diagnoses:   Pleurisy   Rhinovirus infection         DISPOSITION  Discharge           Ricardo Fowler MD  11/03/22 0316

## 2022-11-02 NOTE — ED TRIAGE NOTES
Pt arrived ambulatory via PV c/o URI symptoms for the last month. States she has been coughing up green sputum and now states she has mid sternal CP. Pt does not appear to be in resipitory distress at triage.

## 2022-11-03 LAB — QT INTERVAL: 413 MS

## 2022-11-03 NOTE — ED NOTES
"Pt to ED from home c/o CP, cough, generalized weakness x 1 month \"not getting better\". Pt reports having COVID \"a month or so ago\" but is currently having midsternal and left anterior CP that radiates into her shoulder blades, pt also states 4 days ago she got lightheaded and thought she was going to pass out, went away and today had similar episode where she felt light headed, called PCP to her to be seen in ED. Pt also complains of nausea, arms feeling \"tired\" and legs feeling \"achy.\" No localized swelling/warmth/tenderness noted to LE. Pt a/o x 4 at this time, spo2 98% RA.     PPE per protocol utilized throughout entire patient encounter.     "

## 2023-04-27 ENCOUNTER — HOSPITAL ENCOUNTER (EMERGENCY)
Facility: HOSPITAL | Age: 53
Discharge: HOME | End: 2023-04-27
Attending: EMERGENCY MEDICINE
Payer: COMMERCIAL

## 2023-04-27 ENCOUNTER — APPOINTMENT (OUTPATIENT)
Dept: GENERAL RADIOLOGY | Facility: HOSPITAL | Age: 53
End: 2023-04-27
Payer: COMMERCIAL

## 2023-04-27 VITALS
SYSTOLIC BLOOD PRESSURE: 97 MMHG | DIASTOLIC BLOOD PRESSURE: 70 MMHG | HEIGHT: 62 IN | RESPIRATION RATE: 16 BRPM | OXYGEN SATURATION: 94 % | WEIGHT: 115 LBS | HEART RATE: 69 BPM | BODY MASS INDEX: 21.16 KG/M2 | TEMPERATURE: 97.6 F

## 2023-04-27 DIAGNOSIS — K20.90 ESOPHAGITIS: ICD-10-CM

## 2023-04-27 DIAGNOSIS — K29.00 ACUTE GASTRITIS WITHOUT HEMORRHAGE, UNSPECIFIED GASTRITIS TYPE: Primary | ICD-10-CM

## 2023-04-27 LAB
ALBUMIN SERPL-MCNC: 4 G/DL (ref 3.5–5.2)
ALBUMIN/GLOB SERPL: 1.7 G/DL
ALP SERPL-CCNC: 102 U/L (ref 39–117)
ALT SERPL W P-5'-P-CCNC: 35 U/L (ref 1–33)
ANION GAP SERPL CALCULATED.3IONS-SCNC: 9.4 MMOL/L (ref 5–15)
AST SERPL-CCNC: 21 U/L (ref 1–32)
BILIRUB SERPL-MCNC: 0.3 MG/DL (ref 0–1.2)
BUN SERPL-MCNC: 11 MG/DL (ref 6–20)
BUN/CREAT SERPL: 14.9 (ref 7–25)
CALCIUM SPEC-SCNC: 9.4 MG/DL (ref 8.6–10.5)
CHLORIDE SERPL-SCNC: 105 MMOL/L (ref 98–107)
CO2 SERPL-SCNC: 25.6 MMOL/L (ref 22–29)
CREAT SERPL-MCNC: 0.74 MG/DL (ref 0.57–1)
DEPRECATED RDW RBC AUTO: 40.8 FL (ref 37–54)
EGFRCR SERPLBLD CKD-EPI 2021: 96.3 ML/MIN/1.73
EOSINOPHIL # BLD MANUAL: 0.55 10*3/MM3 (ref 0–0.4)
EOSINOPHIL NFR BLD MANUAL: 4.1 % (ref 0.3–6.2)
ERYTHROCYTE [DISTWIDTH] IN BLOOD BY AUTOMATED COUNT: 12.4 % (ref 12.3–15.4)
GLOBULIN UR ELPH-MCNC: 2.3 GM/DL
GLUCOSE SERPL-MCNC: 93 MG/DL (ref 65–99)
HCT VFR BLD AUTO: 41.9 % (ref 34–46.6)
HGB BLD-MCNC: 14.2 G/DL (ref 12–15.9)
LIPASE SERPL-CCNC: 18 U/L (ref 13–60)
LYMPHOCYTES # BLD MANUAL: 6.3 10*3/MM3 (ref 0.7–3.1)
LYMPHOCYTES NFR BLD MANUAL: 1 % (ref 5–12)
MCH RBC QN AUTO: 30.1 PG (ref 26.6–33)
MCHC RBC AUTO-ENTMCNC: 33.9 G/DL (ref 31.5–35.7)
MCV RBC AUTO: 89 FL (ref 79–97)
MONOCYTES # BLD: 0.13 10*3/MM3 (ref 0.1–0.9)
NEUTROPHILS # BLD AUTO: 6.45 10*3/MM3 (ref 1.7–7)
NEUTROPHILS NFR BLD MANUAL: 48 % (ref 42.7–76)
NT-PROBNP SERPL-MCNC: 42.7 PG/ML (ref 0–900)
PLAT MORPH BLD: NORMAL
PLATELET # BLD AUTO: 268 10*3/MM3 (ref 140–450)
PMV BLD AUTO: 11.7 FL (ref 6–12)
POTASSIUM SERPL-SCNC: 4.1 MMOL/L (ref 3.5–5.2)
PROT SERPL-MCNC: 6.3 G/DL (ref 6–8.5)
QT INTERVAL: 405 MS
RBC # BLD AUTO: 4.71 10*6/MM3 (ref 3.77–5.28)
RBC MORPH BLD: NORMAL
SMUDGE CELLS BLD QL SMEAR: ABNORMAL
SODIUM SERPL-SCNC: 140 MMOL/L (ref 136–145)
TROPONIN T SERPL HS-MCNC: <6 NG/L
VARIANT LYMPHS NFR BLD MANUAL: 46.9 % (ref 19.6–45.3)
WBC NRBC COR # BLD: 13.44 10*3/MM3 (ref 3.4–10.8)

## 2023-04-27 PROCEDURE — 83880 ASSAY OF NATRIURETIC PEPTIDE: CPT | Performed by: EMERGENCY MEDICINE

## 2023-04-27 PROCEDURE — 85007 BL SMEAR W/DIFF WBC COUNT: CPT | Performed by: EMERGENCY MEDICINE

## 2023-04-27 PROCEDURE — 93010 ELECTROCARDIOGRAM REPORT: CPT | Performed by: INTERNAL MEDICINE

## 2023-04-27 PROCEDURE — 85025 COMPLETE CBC W/AUTO DIFF WBC: CPT | Performed by: EMERGENCY MEDICINE

## 2023-04-27 PROCEDURE — 84484 ASSAY OF TROPONIN QUANT: CPT | Performed by: EMERGENCY MEDICINE

## 2023-04-27 PROCEDURE — 99284 EMERGENCY DEPT VISIT MOD MDM: CPT

## 2023-04-27 PROCEDURE — 25010000002 ONDANSETRON PER 1 MG: Performed by: EMERGENCY MEDICINE

## 2023-04-27 PROCEDURE — 93005 ELECTROCARDIOGRAM TRACING: CPT | Performed by: EMERGENCY MEDICINE

## 2023-04-27 PROCEDURE — 83690 ASSAY OF LIPASE: CPT | Performed by: EMERGENCY MEDICINE

## 2023-04-27 PROCEDURE — 96374 THER/PROPH/DIAG INJ IV PUSH: CPT

## 2023-04-27 PROCEDURE — 80053 COMPREHEN METABOLIC PANEL: CPT | Performed by: EMERGENCY MEDICINE

## 2023-04-27 PROCEDURE — 71045 X-RAY EXAM CHEST 1 VIEW: CPT

## 2023-04-27 RX ORDER — PANTOPRAZOLE SODIUM 40 MG/1
40 TABLET, DELAYED RELEASE ORAL 2 TIMES DAILY
Qty: 60 TABLET | Refills: 0 | Status: SHIPPED | OUTPATIENT
Start: 2023-04-27

## 2023-04-27 RX ORDER — LIDOCAINE HYDROCHLORIDE 20 MG/ML
15 SOLUTION OROPHARYNGEAL ONCE
Status: COMPLETED | OUTPATIENT
Start: 2023-04-27 | End: 2023-04-27

## 2023-04-27 RX ORDER — SODIUM CHLORIDE 0.9 % (FLUSH) 0.9 %
10 SYRINGE (ML) INJECTION AS NEEDED
Status: DISCONTINUED | OUTPATIENT
Start: 2023-04-27 | End: 2023-04-27 | Stop reason: HOSPADM

## 2023-04-27 RX ORDER — ONDANSETRON 2 MG/ML
4 INJECTION INTRAMUSCULAR; INTRAVENOUS ONCE
Status: COMPLETED | OUTPATIENT
Start: 2023-04-27 | End: 2023-04-27

## 2023-04-27 RX ORDER — SUCRALFATE ORAL 1 G/10ML
1 SUSPENSION ORAL 4 TIMES DAILY
Qty: 500 ML | Refills: 0 | Status: SHIPPED | OUTPATIENT
Start: 2023-04-27

## 2023-04-27 RX ORDER — ALUMINA, MAGNESIA, AND SIMETHICONE 2400; 2400; 240 MG/30ML; MG/30ML; MG/30ML
15 SUSPENSION ORAL ONCE
Status: COMPLETED | OUTPATIENT
Start: 2023-04-27 | End: 2023-04-27

## 2023-04-27 RX ADMIN — LIDOCAINE HYDROCHLORIDE 15 ML: 20 SOLUTION ORAL at 12:17

## 2023-04-27 RX ADMIN — ALUMINUM HYDROXIDE, MAGNESIUM HYDROXIDE, AND DIMETHICONE 15 ML: 400; 400; 40 SUSPENSION ORAL at 12:17

## 2023-04-27 RX ADMIN — ONDANSETRON 4 MG: 2 INJECTION INTRAMUSCULAR; INTRAVENOUS at 13:08

## 2023-04-27 NOTE — ED NOTES
Pt to ER from home to left sided chest pain that radiates to L arm. Pt states NTG helped her pain and is only having upper abd pain at the moment.    This RN in appropriate PPE while in pt room. Pt wearing mask

## 2023-04-27 NOTE — ED PROVIDER NOTES
" EMERGENCY DEPARTMENT ENCOUNTER    Room Number:  34/34  Date seen:  4/27/2023  PCP: Yuri Wilson MD  Historian: Patient      HPI:  Chief Complaint: Patient    A complete HPI/ROS/PMH/PSH/SH/FH are unobtainable due to: N/A    Context: Kim Cancino is a 53 y.o. female who presents to the ED c/o epigastric pain radiating to the left chest.  It is described as burning, is worse at night when she is lying.  She has been having a lot of belching as well.  No vomiting/hematemesis, no black or bloody stools.  She woke up last night with a \"heavy\" sensation in her chest that has been constant-these symptoms prompted her emergency department visit.  No fevers or chills.  No cough or congestion.  No lower extremity edema.    She smokes.  She does drink alcohol \"on the weekends\" and only occasionally through the week.  No prior history of gallbladder or pancreas issues.  She does have reflux and takes pantoprazole daily.    Additional sources:  - Discussed/ obtained information from independent historians: No-she is unaccompanied today    - External (non-ED) record review: Patient was seen earlier today at an urgent care center and sent to the emergency department for further evaluation.  Last primary care physician visit was in February of this year for chronic pharyngitis.  Last ER visit was in November 2022-diagnosed with rhinovirus and pleurisy.    - Chronic or social conditions impacting care: None of which I am aware.      PAST MEDICAL HISTORY  Active Ambulatory Problems     Diagnosis Date Noted   • No Active Ambulatory Problems     Resolved Ambulatory Problems     Diagnosis Date Noted   • No Resolved Ambulatory Problems     No Additional Past Medical History         PAST SURGICAL HISTORY  No past surgical history on file.      FAMILY HISTORY  No family history on file.      SOCIAL HISTORY  Social History     Socioeconomic History   • Marital status:    Tobacco Use   • Smoking status: Former   • Smokeless " tobacco: Never   Substance and Sexual Activity   • Alcohol use: Yes   • Drug use: No   • Sexual activity: Defer         ALLERGIES  Flagyl [metronidazole]        REVIEW OF SYSTEMS  Review of Systems   Constitutional: Negative for chills and fever.   Respiratory: Positive for chest tightness.    Cardiovascular: Positive for chest pain. Negative for leg swelling.   Gastrointestinal: Negative for abdominal pain, blood in stool, diarrhea, nausea and vomiting.            PHYSICAL EXAM  ED Triage Vitals [04/27/23 1148]   Temp Heart Rate Resp BP SpO2   97.6 °F (36.4 °C) 91 16 123/78 96 %      Temp src Heart Rate Source Patient Position BP Location FiO2 (%)   Tympanic Monitor -- -- --       Physical Exam      Physical Exam   Constitutional: Pt. is oriented to person, place, and time.  She is well-developed, well-nourished, and in no distress.    HENT: Normocephalic and atraumatic. Pupils are equal, round, and reactive to light.   Neck: Normal range of motion. Neck supple. No JVD present. No tracheal deviation present.   Cardiovascular: Normal rate, regular rhythm and normal heart sounds.   Pulmonary/Chest: Effort normal and breath sounds normal. No stridor. No respiratory distress. No wheezes, no rales.   Abdominal: Soft.  No distension. There is minimal epigastric tenderness. There is no rebound and no guarding.   Musculoskeletal: No edema, tenderness or deformity.   Neurological: She is alert and oriented to person, place, and time.  She has no focal neurologic deficits.  Skin: Skin is warm and dry. Pt. is not diaphoretic.  Psychiatric: Mood, affect normal.  She is pleasant and cooperative.  Nursing note and vitals reviewed.            LAB RESULTS  Recent Results (from the past 24 hour(s))   Comprehensive Metabolic Panel    Collection Time: 04/27/23 12:12 PM    Specimen: Blood   Result Value Ref Range    Glucose 93 65 - 99 mg/dL    BUN 11 6 - 20 mg/dL    Creatinine 0.74 0.57 - 1.00 mg/dL    Sodium 140 136 - 145 mmol/L     Potassium 4.1 3.5 - 5.2 mmol/L    Chloride 105 98 - 107 mmol/L    CO2 25.6 22.0 - 29.0 mmol/L    Calcium 9.4 8.6 - 10.5 mg/dL    Total Protein 6.3 6.0 - 8.5 g/dL    Albumin 4.0 3.5 - 5.2 g/dL    ALT (SGPT) 35 (H) 1 - 33 U/L    AST (SGOT) 21 1 - 32 U/L    Alkaline Phosphatase 102 39 - 117 U/L    Total Bilirubin 0.3 0.0 - 1.2 mg/dL    Globulin 2.3 gm/dL    A/G Ratio 1.7 g/dL    BUN/Creatinine Ratio 14.9 7.0 - 25.0    Anion Gap 9.4 5.0 - 15.0 mmol/L    eGFR 96.3 >60.0 mL/min/1.73   Lipase    Collection Time: 04/27/23 12:12 PM    Specimen: Blood   Result Value Ref Range    Lipase 18 13 - 60 U/L   BNP    Collection Time: 04/27/23 12:12 PM    Specimen: Blood   Result Value Ref Range    proBNP 42.7 0.0 - 900.0 pg/mL   Single High Sensitivity Troponin T    Collection Time: 04/27/23 12:12 PM    Specimen: Blood   Result Value Ref Range    HS Troponin T <6 <10 ng/L   CBC Auto Differential    Collection Time: 04/27/23 12:12 PM    Specimen: Blood   Result Value Ref Range    WBC 13.44 (H) 3.40 - 10.80 10*3/mm3    RBC 4.71 3.77 - 5.28 10*6/mm3    Hemoglobin 14.2 12.0 - 15.9 g/dL    Hematocrit 41.9 34.0 - 46.6 %    MCV 89.0 79.0 - 97.0 fL    MCH 30.1 26.6 - 33.0 pg    MCHC 33.9 31.5 - 35.7 g/dL    RDW 12.4 12.3 - 15.4 %    RDW-SD 40.8 37.0 - 54.0 fl    MPV 11.7 6.0 - 12.0 fL    Platelets 268 140 - 450 10*3/mm3   Manual Differential    Collection Time: 04/27/23 12:12 PM    Specimen: Blood   Result Value Ref Range    Neutrophil % 48.0 42.7 - 76.0 %    Lymphocyte % 46.9 (H) 19.6 - 45.3 %    Monocyte % 1.0 (L) 5.0 - 12.0 %    Eosinophil % 4.1 0.3 - 6.2 %    Neutrophils Absolute 6.45 1.70 - 7.00 10*3/mm3    Lymphocytes Absolute 6.30 (H) 0.70 - 3.10 10*3/mm3    Monocytes Absolute 0.13 0.10 - 0.90 10*3/mm3    Eosinophils Absolute 0.55 (H) 0.00 - 0.40 10*3/mm3    RBC Morphology Normal Normal    Smudge Cells Slight/1+ None Seen    Platelet Morphology Normal Normal   ECG 12 Lead Chest Pain    Collection Time: 04/27/23 12:15 PM   Result Value  Ref Range    QT Interval 405 ms       Ordered the above labs and reviewed the results.        RADIOLOGY  XR Chest 1 View    Result Date: 4/27/2023  XR CHEST 1 VW-  HISTORY: Female who is 53 years-old,  chest pain  TECHNIQUE: Frontal view of the chest  COMPARISON: 11/02/2022  FINDINGS: Heart, mediastinum and pulmonary vasculature are unremarkable. No focal pulmonary consolidation, pleural effusion, or pneumothorax. No acute osseous process.      No evidence for acute pulmonary process. Follow-up as clinical indications persist.  This report was finalized on 4/27/2023 12:39 PM by Dr. Rob Cohn M.D.        Ordered the above noted radiological studies. Reviewed by me in PACS.        PROCEDURES  Procedures      MEDICATIONS GIVEN IN ER  Medications   sodium chloride 0.9 % flush 10 mL (has no administration in time range)   aluminum-magnesium hydroxide-simethicone (MAALOX MAX) 400-400-40 MG/5ML suspension 15 mL (15 mL Oral Given 4/27/23 1217)   Lidocaine Viscous HCl (XYLOCAINE) 2 % solution 15 mL (15 mL Mouth/Throat Given 4/27/23 1217)   ondansetron (ZOFRAN) injection 4 mg (4 mg Intravenous Given 4/27/23 1308)             MEDICAL DECISION MAKING, PROGRESS, and CONSULTS    All labs have been independently reviewed by me.  All radiology studies have been reviewed by me and discussed with radiologist dictating the report.   EKG's independently viewed and interpreted by me.  Discussion below represents my analysis of pertinent findings related to patient's condition, differential diagnosis, treatment plan and final disposition.      Orders placed during this visit:  Orders Placed This Encounter   Procedures   • XR Chest 1 View   • Comprehensive Metabolic Panel   • Lipase   • BNP   • Single High Sensitivity Troponin T   • CBC Auto Differential   • Manual Differential   • ECG 12 Lead Chest Pain   • ECG 12 Lead Chest Pain   • Insert Peripheral IV   • CBC & Differential       Additional orders considered but not  ordered:  N/A    Differential diagnosis:  Differential diagnosis for chest pain/dyspnea includes but is not limited to:  Muscle strain, costochondritis, myositis, pleurisy, rib fracture, intercostal neuritis, herpes zoster, tumor, myocardial infarction, coronary syndrome, unstable angina, angina, aortic dissection, mitral valve prolapse, pericarditis, palpitations, pulmonary embolus, pneumonia, pneumothorax, lung cancer, GERD, esophagitis, esophageal spasm      Independent interpretation of labs, radiology studies, and discussions with consultants:  ED Course as of 04/27/23 1342   Thu Apr 27, 2023   1241 EKG performed at 12:15 PM and interpreted by me shows normal sinus rhythm with a rate of 72 bpm.  Intervals, QRS complexes and ST-T segments unremarkable.  There is no significant change compared to 11/2/2022. [WC]   1301 CBC, CMP, BNP, troponin and lipase are all normal. [WC]   1302 Chest X-ray was independently visualized and preliminarily interpreted by me.  I see no effusions or infiltrates. CXR was discussed with/officially interpreted by Dr. Cohn (radiology)-no acute processes identified.  For official interpretation, see dictated report. [WC]   1340 We will have patient increase pantoprazole to twice daily.  Carafate will be added.  She was advised to stop drinking alcohol. [WC]   1342 Labs, EKG and imaging as above.  My clinical suspicion for a serious underlying pulmonary or cardiac etiology is low.  There is no evidence to suggest pulmonary embolism, aortic dissection or acute coronary syndrome.  No evidence of pneumonia/CHF/COPD exacerbation that would require inpatient admission.  The patient was advised to return to the emergency department should the symptoms worsen or for any new concerns.  The patient can be safely followed as an outpatient for further workup as indicated.    HEART SCORE    History Slightly or non-suspicious (0)  ECG Normal (0)  Age 46-65 (1)  Risk factors No risk factors  (0)  Troponin < or = Normal limit (0)    This patient's HEART score is 1    HEART Score Key:  Scores 0-3: 0.9-1.7% risk of adverse cardiac event. In the HEART Score study, these patients were discharged (0.99% in the retrospective study, 1.7% in the prospective study)  Scores 4-6: 12-16.6% risk of adverse cardiac event. In the HEART Score study, these patients were admitted to the hospital. (11.6% retrospective, 16.6% prospective)  Scores ?7: 50-65% risk of adverse cardiac event. In the HEART Score study, these patients were candidates for early invasive measures. (65.2% retrospective, 50.1% prospective)     [WC]      ED Course User Index  [WC] Tanner Miller MD              Appropriate PPE was worn by myself and the patient throughout our entire interaction.    DIAGNOSIS  Final diagnoses:   Acute gastritis without hemorrhage, unspecified gastritis type   Esophagitis         DISPOSITION  Discharged            Latest Documented Vital Signs:  As of 13:42 EDT  BP- 97/70 HR- 69 Temp- 97.6 °F (36.4 °C) (Tympanic) O2 sat- 94%        --    Please note that portions of this were completed with a voice recognition program.       Note Disclaimer: At [facility], we believe that sharing information builds trust and better relationships. You are receiving this note because you are receiving care at [facility] or recently visited. It is possible you will see health information before a provider has talked with you about it. This kind of information can be easy to misunderstand. To help you fully understand what it means for your health, we urge you to discuss this note with your provider.           Tanner Miller MD  04/27/23 7459